# Patient Record
Sex: MALE | Race: WHITE | NOT HISPANIC OR LATINO | Employment: OTHER | URBAN - METROPOLITAN AREA
[De-identification: names, ages, dates, MRNs, and addresses within clinical notes are randomized per-mention and may not be internally consistent; named-entity substitution may affect disease eponyms.]

---

## 2017-07-26 ENCOUNTER — WORRISOME GROWTH - SEE NOTE (OUTPATIENT)
Dept: URBAN - METROPOLITAN AREA CLINIC 34 | Facility: CLINIC | Age: 62
Setting detail: DERMATOLOGY
End: 2017-07-26

## 2017-07-26 PROBLEM — L82.1 OTHER SEBORRHEIC KERATOSIS: Status: RESOLVED | Noted: 2017-07-26

## 2017-07-26 PROBLEM — L57.0 ACTINIC KERATOSIS: Status: RESOLVED | Noted: 2017-07-26

## 2017-07-26 PROCEDURE — 99203 OFFICE O/P NEW LOW 30 MIN: CPT

## 2017-07-26 PROCEDURE — 17000 DESTRUCT PREMALG LESION: CPT

## 2017-07-26 RX ORDER — TRIAMCINOLONE ACETONIDE 1 MG/G
CREAM TOPICAL
Qty: 85.2 | Refills: 1
Start: 2017-07-26

## 2020-11-24 ENCOUNTER — DASHBOARD ENCOUNTERS (OUTPATIENT)
Age: 65
End: 2020-11-24

## 2020-11-24 ENCOUNTER — OFFICE VISIT (OUTPATIENT)
Dept: URBAN - METROPOLITAN AREA CLINIC 12 | Facility: CLINIC | Age: 65
End: 2020-11-24
Payer: MEDICARE

## 2020-11-24 DIAGNOSIS — K64.4 EXTERNAL HEMORRHOID: ICD-10-CM

## 2020-11-24 DIAGNOSIS — L29.0 ANAL ITCHING: ICD-10-CM

## 2020-11-24 DIAGNOSIS — Z12.11 COLON CANCER SCREENING: ICD-10-CM

## 2020-11-24 DIAGNOSIS — K62.89 ANAL PAIN: ICD-10-CM

## 2020-11-24 DIAGNOSIS — K21.9 CHRONIC GERD: ICD-10-CM

## 2020-11-24 PROCEDURE — G9903 PT SCRN TBCO ID AS NON USER: HCPCS | Performed by: INTERNAL MEDICINE

## 2020-11-24 PROCEDURE — G8420 CALC BMI NORM PARAMETERS: HCPCS | Performed by: INTERNAL MEDICINE

## 2020-11-24 PROCEDURE — 1036F TOBACCO NON-USER: CPT | Performed by: INTERNAL MEDICINE

## 2020-11-24 PROCEDURE — G8427 DOCREV CUR MEDS BY ELIG CLIN: HCPCS | Performed by: INTERNAL MEDICINE

## 2020-11-24 PROCEDURE — 99204 OFFICE O/P NEW MOD 45 MIN: CPT | Performed by: INTERNAL MEDICINE

## 2020-11-24 PROCEDURE — 3017F COLORECTAL CA SCREEN DOC REV: CPT | Performed by: INTERNAL MEDICINE

## 2020-11-24 RX ORDER — SIMVASTATIN 10 MG/1
1 TABLET IN THE EVENING TABLET, FILM COATED ORAL ONCE A DAY
Status: ACTIVE | COMMUNITY

## 2020-11-24 RX ORDER — OMEPRAZOLE 40 MG/1
1 CAPSULE 30 MINUTES BEFORE MORNING MEAL CAPSULE, DELAYED RELEASE ORAL ONCE A DAY
Status: ACTIVE | COMMUNITY

## 2020-11-24 RX ORDER — HYDROCORTISONE 25 MG/G
1 APPLICATION CREAM TOPICAL TWICE A DAY
Qty: 1 TUBE | Refills: 3 | OUTPATIENT
Start: 2020-11-24 | End: 2021-01-19

## 2020-11-24 NOTE — HPI-TODAY'S VISIT:
65M. pt or dr ray h/o barretts mucosa on egd but path neg for BE h/o recurrent diverticulitis s/p partial colectomy 2004. havent had any diverticulitis since then h/o elevated alkphos GERD--on omeprazole. no gerd syx last EGD/colon 2016 EGD-esophagitis COLON--HP polyp . rec rpt in 3-5 yrs today he c/o anal itching. for many years he has used wipes and hemorrhoid ointmentds BM everyday. no change in stool caliber. no bld in stool LABS-- (2020). ALT WNL

## 2020-11-24 NOTE — EXAM-PHYSICAL EXAM
General--no acute distress Eyes--anicteric HENT--normocephalic, atraumatic head Neck--no lymphadenopathy Chest--normal breath sounds Heart--regular rate and rhythm Abdomen--soft, non tender, non distended, bowel sounds present Musculoskeletal--normal gait and station Skin--no rashes Neurologic--Alert and oriented x 3 Psychiatric--stable mood, appropriate affect NH--small non-thrombosed ext hemorrhoids, no mass/pain/bld/stool on REJI

## 2021-06-28 ENCOUNTER — TELEPHONE ENCOUNTER (OUTPATIENT)
Dept: URBAN - METROPOLITAN AREA CLINIC 23 | Facility: CLINIC | Age: 66
End: 2021-06-28

## 2021-07-13 PROBLEM — 235595009 GASTROESOPHAGEAL REFLUX DISEASE: Status: ACTIVE | Noted: 2020-11-24

## 2021-07-13 PROBLEM — 275978004 COLON CANCER SCREENING: Status: ACTIVE | Noted: 2021-07-13

## 2021-09-02 ENCOUNTER — OFFICE VISIT (OUTPATIENT)
Dept: URBAN - METROPOLITAN AREA LAB 3 | Facility: LAB | Age: 66
End: 2021-09-02
Payer: MEDICARE

## 2021-09-02 DIAGNOSIS — K22.8 COLUMNAR-LINED ESOPHAGUS: ICD-10-CM

## 2021-09-02 DIAGNOSIS — K31.7 BENIGN GASTRIC POLYP: ICD-10-CM

## 2021-09-02 DIAGNOSIS — K62.1 ANAL AND RECTAL POLYP: ICD-10-CM

## 2021-09-02 DIAGNOSIS — K29.30 CHRONIC SUPERFICIAL GASTRITIS: ICD-10-CM

## 2021-09-02 PROCEDURE — 43251 EGD REMOVE LESION SNARE: CPT | Performed by: INTERNAL MEDICINE

## 2021-09-02 PROCEDURE — 43239 EGD BIOPSY SINGLE/MULTIPLE: CPT | Performed by: INTERNAL MEDICINE

## 2021-09-02 PROCEDURE — 45380 COLONOSCOPY AND BIOPSY: CPT | Performed by: INTERNAL MEDICINE

## 2021-09-09 ENCOUNTER — WEB ENCOUNTER (OUTPATIENT)
Dept: URBAN - METROPOLITAN AREA CLINIC 6 | Facility: CLINIC | Age: 66
End: 2021-09-09

## 2022-01-10 ENCOUNTER — TELEPHONE ENCOUNTER (OUTPATIENT)
Dept: URBAN - METROPOLITAN AREA CLINIC 12 | Facility: CLINIC | Age: 67
End: 2022-01-10

## 2023-05-26 ENCOUNTER — TELEPHONE ENCOUNTER (OUTPATIENT)
Dept: URBAN - METROPOLITAN AREA SURGERY CENTER 15 | Facility: SURGERY CENTER | Age: 68
End: 2023-05-26

## 2023-05-26 RX ORDER — HYDROCORTISONE 25 MG/G
1 APPLICATION CREAM TOPICAL TWICE A DAY
Qty: 1 | Refills: 5 | OUTPATIENT
Start: 2020-11-24 | End: 2023-08-18

## 2024-10-22 ENCOUNTER — OFFICE VISIT (OUTPATIENT)
Dept: URBAN - METROPOLITAN AREA CLINIC 12 | Facility: CLINIC | Age: 69
End: 2024-10-22

## 2024-10-22 RX ORDER — ROSUVASTATIN CALCIUM 20 MG/1
1 TABLET TABLET, COATED ORAL ONCE A DAY
Status: ACTIVE | COMMUNITY

## 2024-10-22 NOTE — HPI-TODAY'S VISIT:
69M here for f/u Cards--Dr Lagunas h/o barretts mucosa on egd but path neg for BE h/o recurrent diverticulitis s/p partial colectomy 2004. havent had any diverticulitis since then h/o elevated alkphos GERD . [id number]-lig size hiatal hernia. bx did not show BE.  [id number]--okoks HP polyp . rec rpt in 5 yrs . today he says he stopped omeprazole 3 yrs ago and he doesnt have any GERD symptoms. no dysphagia BM everyday. no change in stool caliber. no bld in stool No FH of GI cancers

## 2024-11-11 ENCOUNTER — TELEPHONE ENCOUNTER (OUTPATIENT)
Dept: URBAN - METROPOLITAN AREA CLINIC 23 | Facility: CLINIC | Age: 69
End: 2024-11-11

## 2024-11-18 ENCOUNTER — TELEPHONE ENCOUNTER (OUTPATIENT)
Dept: URBAN - METROPOLITAN AREA CLINIC 6 | Facility: CLINIC | Age: 69
End: 2024-11-18

## 2025-01-07 ENCOUNTER — OFFICE VISIT (OUTPATIENT)
Dept: URBAN - METROPOLITAN AREA LAB 3 | Facility: LAB | Age: 70
End: 2025-01-07

## 2025-03-05 ENCOUNTER — TELEPHONE ENCOUNTER (OUTPATIENT)
Dept: URBAN - METROPOLITAN AREA CLINIC 12 | Facility: CLINIC | Age: 70
End: 2025-03-05

## 2025-04-15 ENCOUNTER — OFFICE VISIT (OUTPATIENT)
Dept: URBAN - METROPOLITAN AREA LAB 3 | Facility: LAB | Age: 70
End: 2025-04-15

## 2025-04-29 ENCOUNTER — OFFICE VISIT (OUTPATIENT)
Dept: URBAN - METROPOLITAN AREA LAB 3 | Facility: LAB | Age: 70
End: 2025-04-29

## 2025-04-29 RX ORDER — ROSUVASTATIN CALCIUM 20 MG/1
1 TABLET TABLET, COATED ORAL ONCE A DAY
Status: ACTIVE | COMMUNITY

## 2025-05-20 ENCOUNTER — OFFICE VISIT (OUTPATIENT)
Dept: URBAN - METROPOLITAN AREA CLINIC 12 | Facility: CLINIC | Age: 70
End: 2025-05-20
Payer: MEDICARE

## 2025-05-20 DIAGNOSIS — K44.9 HIATAL HERNIA: ICD-10-CM

## 2025-05-20 DIAGNOSIS — Z90.49 S/P COLECTOMY: ICD-10-CM

## 2025-05-20 DIAGNOSIS — K21.9 CHRONIC GERD: ICD-10-CM

## 2025-05-20 PROBLEM — 427816007: Status: ACTIVE | Noted: 2025-05-20

## 2025-05-20 PROBLEM — 84089009: Status: ACTIVE | Noted: 2025-05-20

## 2025-05-20 PROCEDURE — 99213 OFFICE O/P EST LOW 20 MIN: CPT

## 2025-05-20 RX ORDER — ROSUVASTATIN CALCIUM 20 MG/1
1 TABLET TABLET, COATED ORAL ONCE A DAY
Status: ACTIVE | COMMUNITY

## 2025-05-20 NOTE — HPI-TODAY'S VISIT:
Patient is a 69-year-old male presents today for an EGD follow-up.  He was seen by Dr. Rucker on 10/22/2024 for chronic GERD.  He has history of Cho's mucosa on EGD but path negative for Cho's esophagus in 2021.    EGD on 4/29/2025 with Dr. Rucker showed a 3 cm hiatal hernia, irregular Z-line at GE junction, gastritis.  Esophageal biopsy showed chronic inflammation, negative for IM.  Mild chronic inactive gastritis, no H. pylori.  Today he reports feeling well overall.  Denies acid reflux, heartburn, dysphagia, epigastric pain, or nausea/vomiting.  He has not been taking omeprazole for a few years due to improvement in his symptoms.  Reports drinking 2 glasses of wine almost every night, daily carbonated beverages consumption and 3 oranges.  No family history of esophageal disorders or cancer.     History of recurrent diverticulitis s/p partial colectomy in 2004.  Last colon in 2021 showed a small hyperplastic polyp.  Repeat colon in 5 years was advised.

## 2025-05-20 NOTE — PHYSICAL EXAM GASTROINTESTINAL
Abdomen , soft, nontender, nondistended , no guarding or rigidity , no masses palpable,  no hepatosplenomegaly , liver nontender Detail Level: Zone Incidence Of No Shows: 1st